# Patient Record
Sex: MALE | Race: WHITE | NOT HISPANIC OR LATINO | URBAN - METROPOLITAN AREA
[De-identification: names, ages, dates, MRNs, and addresses within clinical notes are randomized per-mention and may not be internally consistent; named-entity substitution may affect disease eponyms.]

---

## 2019-12-30 ENCOUNTER — EMERGENCY (EMERGENCY)
Facility: HOSPITAL | Age: 49
LOS: 1 days | Discharge: HOME | End: 2019-12-30
Attending: EMERGENCY MEDICINE | Admitting: EMERGENCY MEDICINE
Payer: COMMERCIAL

## 2019-12-30 VITALS
RESPIRATION RATE: 16 BRPM | TEMPERATURE: 98 F | WEIGHT: 154.98 LBS | SYSTOLIC BLOOD PRESSURE: 146 MMHG | HEART RATE: 78 BPM | OXYGEN SATURATION: 97 % | HEIGHT: 74 IN | DIASTOLIC BLOOD PRESSURE: 80 MMHG

## 2019-12-30 LAB
ALBUMIN SERPL ELPH-MCNC: 3.8 G/DL — SIGNIFICANT CHANGE UP (ref 3.4–5)
ALP SERPL-CCNC: 67 U/L — SIGNIFICANT CHANGE UP (ref 40–120)
ALT FLD-CCNC: 23 U/L — SIGNIFICANT CHANGE UP (ref 12–42)
ANION GAP SERPL CALC-SCNC: 10 MMOL/L — SIGNIFICANT CHANGE UP (ref 9–16)
AST SERPL-CCNC: 20 U/L — SIGNIFICANT CHANGE UP (ref 15–37)
BILIRUB SERPL-MCNC: 0.3 MG/DL — SIGNIFICANT CHANGE UP (ref 0.2–1.2)
BUN SERPL-MCNC: 8 MG/DL — SIGNIFICANT CHANGE UP (ref 7–23)
CALCIUM SERPL-MCNC: 8.7 MG/DL — SIGNIFICANT CHANGE UP (ref 8.5–10.5)
CHLORIDE SERPL-SCNC: 98 MMOL/L — SIGNIFICANT CHANGE UP (ref 96–108)
CO2 SERPL-SCNC: 26 MMOL/L — SIGNIFICANT CHANGE UP (ref 22–31)
CREAT SERPL-MCNC: 0.86 MG/DL — SIGNIFICANT CHANGE UP (ref 0.5–1.3)
GLUCOSE SERPL-MCNC: 107 MG/DL — HIGH (ref 70–99)
HCT VFR BLD CALC: 38.8 % — LOW (ref 39–50)
HGB BLD-MCNC: 12.9 G/DL — LOW (ref 13–17)
MCHC RBC-ENTMCNC: 30.9 PG — SIGNIFICANT CHANGE UP (ref 27–34)
MCHC RBC-ENTMCNC: 33.2 GM/DL — SIGNIFICANT CHANGE UP (ref 32–36)
MCV RBC AUTO: 93 FL — SIGNIFICANT CHANGE UP (ref 80–100)
NRBC # BLD: 0 /100 WBCS — SIGNIFICANT CHANGE UP (ref 0–0)
PLATELET # BLD AUTO: 303 K/UL — SIGNIFICANT CHANGE UP (ref 150–400)
POTASSIUM SERPL-MCNC: 4 MMOL/L — SIGNIFICANT CHANGE UP (ref 3.5–5.3)
POTASSIUM SERPL-SCNC: 4 MMOL/L — SIGNIFICANT CHANGE UP (ref 3.5–5.3)
PROT SERPL-MCNC: 7.1 G/DL — SIGNIFICANT CHANGE UP (ref 6.4–8.2)
RBC # BLD: 4.17 M/UL — LOW (ref 4.2–5.8)
RBC # FLD: 13.2 % — SIGNIFICANT CHANGE UP (ref 10.3–14.5)
SODIUM SERPL-SCNC: 134 MMOL/L — SIGNIFICANT CHANGE UP (ref 132–145)
TROPONIN I SERPL-MCNC: <0.017 NG/ML — LOW (ref 0.02–0.06)
WBC # BLD: 4.71 K/UL — SIGNIFICANT CHANGE UP (ref 3.8–10.5)
WBC # FLD AUTO: 4.71 K/UL — SIGNIFICANT CHANGE UP (ref 3.8–10.5)

## 2019-12-30 PROCEDURE — 93010 ELECTROCARDIOGRAM REPORT: CPT | Mod: 59

## 2019-12-30 PROCEDURE — 71046 X-RAY EXAM CHEST 2 VIEWS: CPT | Mod: 26

## 2019-12-30 PROCEDURE — 99285 EMERGENCY DEPT VISIT HI MDM: CPT | Mod: 25

## 2019-12-30 NOTE — ED PROVIDER NOTE - CARE PROVIDER_API CALL
Boy Carrera)  Cardiovascular Disease  [address] [facility], [address]rd [facility] [place] [address]  Phone: [phone]  Fax: [phone]  Follow Up Time:

## 2019-12-30 NOTE — ED PROVIDER NOTE - DIAGNOSTIC INTERPRETATION
ER Physician: Juancarlos Jin  CHEST XRAY INTERPRETATION: lungs clear, heart shadow normal, bony structures intact

## 2019-12-30 NOTE — ED PROVIDER NOTE - OBJECTIVE STATEMENT
48 yo male with no pertinent past medical history complains of cramping chest pain at night x 3 days. Pt report cold symptoms x 1 week, 48 yo male with no pertinent past medical history complains of episodic chest pain at night x 3 days, cramping and sharp in nature. Pt report cold symptoms x 1 week, and has been taking Sudafed for symptom relief. Pt admits to taking adderall as well with the sudafed to keep up at work. Pt reports improvement of cold symptoms, so he took a hot yoga class, but reports recurrence of chest pain, more intense in nature than previous episodes; pt took a dose of xanax last night with improvement of chest pain. No SOB, fever, chills, dizziness, back pain, nausea, vomiting, smoking history, illicit drug use, recent travel or hospitalizations.  Pt took fluconazole from previous mole toxicity 2 days ago with no improvement. 48 yo male with no pertinent past medical history complains of episodic chest pain at night x 3 days, cramping and sharp in nature. Pt report cold symptoms x 1 week, and has been taking Sudafed for symptom relief. Pt admits to taking Adderall as well with the Sudafed to keep up at work. Pt reports improvement of cold symptoms, so he took a hot yoga class, but reports recurrence of chest pain, more intense in nature than previous episodes; pt took a dose of xanax last night with improvement of chest pain. No SOB, fever, chills, dizziness, back pain, nausea, vomiting, smoking history, illicit drug use, recent travel or hospitalizations.  Pt took fluconazole from previous mole toxicity 2 days ago with no improvement.  Denies pertinent family history of MI at a young age.

## 2019-12-30 NOTE — ED PROVIDER NOTE - PATIENT PORTAL LINK FT
You can access the FollowMyHealth Patient Portal offered by [facility] by registering at the following website: [email or web address]. By joining [employer]’s FollowMyHealth portal, you will also be able to view your health information using other applications (apps) compatible with our system.

## 2019-12-30 NOTE — ED ADULT NURSE NOTE - NSIMPLEMENTINTERV_GEN_ALL_ED
Implemented All Universal Safety Interventions:  [place] to call system. Call bell, personal items and telephone within reach. Instruct patient to call for assistance. Room bathroom lighting operational. Non-slip footwear when patient is off stretcher. Physically safe environment: no spills, clutter or unnecessary equipment. Stretcher in lowest position, wheels locked, appropriate side rails in place.

## 2019-12-30 NOTE — ED ADULT NURSE NOTE - CHIEF COMPLAINT QUOTE
Patient c/o chest pain over the last 3 nights, pain only occurs at night , pt reports taking Sudafed and Adderall for the 3 days. Pain is relieved by xanax. Denies active chest pain at this time.

## 2020-01-06 DIAGNOSIS — R07.89 OTHER CHEST PAIN: ICD-10-CM

## 2020-01-06 DIAGNOSIS — Z88.2 ALLERGY STATUS TO SULFONAMIDES: ICD-10-CM

## 2020-05-04 PROBLEM — Z00.00 ENCOUNTER FOR PREVENTIVE HEALTH EXAMINATION: Status: ACTIVE | Noted: 2020-05-04

## 2020-05-04 PROBLEM — Z78.9 OTHER SPECIFIED HEALTH STATUS: Chronic | Status: ACTIVE | Noted: 2019-12-30

## 2020-05-05 ENCOUNTER — APPOINTMENT (OUTPATIENT)
Dept: HEART AND VASCULAR | Facility: CLINIC | Age: 50
End: 2020-05-05
Payer: COMMERCIAL

## 2020-05-05 DIAGNOSIS — Z87.19 PERSONAL HISTORY OF OTHER DISEASES OF THE DIGESTIVE SYSTEM: ICD-10-CM

## 2020-05-05 DIAGNOSIS — Z86.19 PERSONAL HISTORY OF OTHER INFECTIOUS AND PARASITIC DISEASES: ICD-10-CM

## 2020-05-05 PROCEDURE — 99204 OFFICE O/P NEW MOD 45 MIN: CPT | Mod: 95

## 2020-05-05 RX ORDER — VALACYCLOVIR 500 MG/1
500 TABLET, FILM COATED ORAL
Qty: 60 | Refills: 0 | Status: ACTIVE | COMMUNITY
Start: 2019-12-05

## 2020-05-05 RX ORDER — ALBUTEROL SULFATE 90 UG/1
108 (90 BASE) AEROSOL, METERED RESPIRATORY (INHALATION)
Qty: 18 | Refills: 0 | Status: ACTIVE | COMMUNITY
Start: 2020-04-14

## 2020-05-05 RX ORDER — DEXTROAMPHETAMINE SACCHARATE, AMPHETAMINE ASPARTATE MONOHYDRATE, DEXTROAMPHETAMINE SULFATE AND AMPHETAMINE SULFATE 2.5; 2.5; 2.5; 2.5 MG/1; MG/1; MG/1; MG/1
10 CAPSULE, EXTENDED RELEASE ORAL
Qty: 28 | Refills: 0 | Status: ACTIVE | COMMUNITY
Start: 2020-03-06

## 2020-05-05 RX ORDER — ALPRAZOLAM 0.5 MG/1
0.5 TABLET ORAL
Qty: 30 | Refills: 0 | Status: ACTIVE | COMMUNITY
Start: 2020-03-06

## 2020-05-05 NOTE — DISCUSSION/SUMMARY
[FreeTextEntry1] : palps/chest pain will check holter, primary care evaluation given recent covid suspect \par DEAN ANGULO and I discussed his lipid panel and individualized target LDL goal. At this point, will do diet and exercise with anticipation of re-evaluating labs in 3-6 months\par

## 2020-05-05 NOTE — REASON FOR VISIT
[Initial Evaluation] : an initial evaluation of [Chest Pain] : chest pain [FreeTextEntry1] : This visit was provided via telehealth using real-time 2 way audio visual technology. The patient, KEV BIGGS, was located at home, [address]\[place] [address] , at the time of the vist. The Doctor, Boy Carrera MD, [facility], was located at his medical office located at [address] [place] [address], [address] Floor, [place] [address] at the time of the visit. The patient, KEV BIGGS and the Doctor participated in telehealth encounter. Verbal consent was given on May 05, 2020  by the patient, self.\par \par  48 yo male with no pertinent past medical history\par complains of episodic chest pain at night x 3 days, cramping and sharp in\par nature. Pt report cold symptoms x 1 week, and has been taking Sudafed for\par symptom relief. Pt admits to taking Adderall as well with the Sudafed to keep\par up at work. Pt reports improvement of cold symptoms, so he took a hot yoga\par class, but reports recurrence of chest pain, more intense in nature than\par previous episodes; pt took a dose of xanax last night with improvement of chest\par pain. No SOB, fever, chills, dizziness, back pain, nausea, vomiting, smoking\par history, illicit drug use, recent travel or hospitalizations.\par Pt took fluconazole from previous mole toxicity 2 days ago with no improvement.\par  Denies pertinent family history of MI at a young age.

## 2021-07-13 ENCOUNTER — NON-APPOINTMENT (OUTPATIENT)
Age: 51
End: 2021-07-13

## 2021-07-13 ENCOUNTER — APPOINTMENT (OUTPATIENT)
Dept: HEART AND VASCULAR | Facility: CLINIC | Age: 51
End: 2021-07-13
Payer: COMMERCIAL

## 2021-07-13 VITALS
DIASTOLIC BLOOD PRESSURE: 82 MMHG | HEIGHT: 74 IN | WEIGHT: 156 LBS | HEART RATE: 78 BPM | BODY MASS INDEX: 20.02 KG/M2 | OXYGEN SATURATION: 99 % | SYSTOLIC BLOOD PRESSURE: 125 MMHG

## 2021-07-13 PROCEDURE — 99214 OFFICE O/P EST MOD 30 MIN: CPT

## 2021-07-13 PROCEDURE — 99072 ADDL SUPL MATRL&STAF TM PHE: CPT

## 2021-07-13 PROCEDURE — 93000 ELECTROCARDIOGRAM COMPLETE: CPT

## 2021-07-13 RX ORDER — DUTASTERIDE 0.5 MG/1
0.5 CAPSULE, LIQUID FILLED ORAL DAILY
Refills: 0 | Status: ACTIVE | COMMUNITY
Start: 2021-07-13

## 2021-07-13 NOTE — REASON FOR VISIT
[Initial Evaluation] : an initial evaluation of [Chest Pain] : chest pain [FreeTextEntry1] : 51 yo man who is being seen for angina and palpitations. Holter from last year revealed no arrhythmias but PAC/PVCs. Denies SOB/HASSAN, DALLAS and dizziness. He still complaints of intermittent palpitations for a few seconds and resolves on its own. He also reports  diffuse "cramp-like" chest pain and at one time he had worse cramping chest pain when he bent down. Since he was last seen, the symptoms resolved and has now returned again. He his here today for follow up.

## 2021-07-13 NOTE — DISCUSSION/SUMMARY
[FreeTextEntry1] : EKG section of the chart --- secondary to symptoms above an electrocardiogram also known as an EKG was performed.  Risks and benefits discussed with the patient. Patient was given time and privacy to changed into a gown. Shortly after, standard 10 leads were applied and a [employer] system was used to perform the study. The results were subsequently reviewed by attending physician and discussed with the patient. The study showed a normal sinus rhythm and no ST-T suggestive of ischemia. Order for the EKG was placed in the chart. The results were documented. Billing submitted. Emotional support provided.\par HLD KEV and I discussed his lipid panel and individualized target LDL goal. At this point, will do diet and exercise with anticipation of re-evaluating labs in 3-6 months\par CP/palps KEV and I had a discussion of his symptoms. His risk for CAD falls intro intermediate. We will therefore move forward with a stress ekg and echocardiogram at this time to evaluate for obstructive CAD, provided an insurance approval can be obtained. Risks and benefits discussed.\par

## 2021-07-13 NOTE — PHYSICAL EXAM
[Well Developed] : well developed [Well Nourished] : well nourished [No Acute Distress] : no acute distress [Normal Conjunctiva] : normal conjunctiva [Normal Venous Pressure] : normal venous pressure [No Carotid Bruit] : no carotid bruit [Normal S1, S2] : normal S1, S2 [No Murmur] : no murmur [No Rub] : no rub [No Gallop] : no gallop [Clear Lung Fields] : clear lung fields [Good Air Entry] : good air entry [No Respiratory Distress] : no respiratory distress  [Soft] : abdomen soft [Non Tender] : non-tender [No Masses/organomegaly] : no masses/organomegaly [Normal Bowel Sounds] : normal bowel sounds [Normal Gait] : normal gait [No Edema] : no edema [No Cyanosis] : no cyanosis [No Clubbing] : no clubbing [No Varicosities] : no varicosities [No Rash] : no rash [No Skin Lesions] : no skin lesions [Moves all extremities] : moves all extremities [No Focal Deficits] : no focal deficits [Normal Speech] : normal speech [Alert and Oriented] : alert and oriented [Normal memory] : normal memory [General Appearance - Well Developed] : well developed [Normal Appearance] : normal appearance [Well Groomed] : well groomed [General Appearance - Well Nourished] : well nourished [No Deformities] : no deformities [General Appearance - In No Acute Distress] : no acute distress

## 2021-08-11 ENCOUNTER — APPOINTMENT (OUTPATIENT)
Dept: HEART AND VASCULAR | Facility: CLINIC | Age: 51
End: 2021-08-11
Payer: COMMERCIAL

## 2021-08-11 VITALS
HEIGHT: 74 IN | BODY MASS INDEX: 20.53 KG/M2 | TEMPERATURE: 98.1 F | HEART RATE: 77 BPM | OXYGEN SATURATION: 98 % | WEIGHT: 160 LBS | DIASTOLIC BLOOD PRESSURE: 78 MMHG | SYSTOLIC BLOOD PRESSURE: 124 MMHG

## 2021-08-11 DIAGNOSIS — E78.2 MIXED HYPERLIPIDEMIA: ICD-10-CM

## 2021-08-11 DIAGNOSIS — R07.89 OTHER CHEST PAIN: ICD-10-CM

## 2021-08-11 DIAGNOSIS — R00.2 PALPITATIONS: ICD-10-CM

## 2021-08-11 PROCEDURE — 93306 TTE W/DOPPLER COMPLETE: CPT

## 2021-08-11 PROCEDURE — 93015 CV STRESS TEST SUPVJ I&R: CPT

## 2021-08-11 PROCEDURE — 99214 OFFICE O/P EST MOD 30 MIN: CPT | Mod: 25

## 2021-08-11 NOTE — REASON FOR VISIT
[Symptom and Test Evaluation] : symptom and test evaluation [FreeTextEntry1] : 51 yo man who is being seen for angina and palpitations. Holter from last year revealed no arrhythmias but PAC/PVCs. Denies SOB/HASSAN, DALLAS and dizziness. He still complaints of intermittent palpitations for a few seconds and resolves on its own. He also reports  diffuse "cramp-like" chest pain and at one time he had worse cramping chest pain when he bent down. Since he was last seen, the symptoms resolved and has now returned again. He his here today for follow up after his stress test.

## 2021-08-11 NOTE — DISCUSSION/SUMMARY
[FreeTextEntry1] : CP/palps stress test reviewed. Inclined towards a conservative follow up in this patient. We had a careful discussion regarding diet and exercise. Will be happy to re-evaluate.\par DEAN ANGULO and I discussed his lipid panel and individualized target LDL goal. At this point, will do diet and exercise with anticipation of re-evaluating labs in 3-6 months\par

## 2022-02-09 ENCOUNTER — APPOINTMENT (OUTPATIENT)
Dept: HEART AND VASCULAR | Facility: CLINIC | Age: 52
End: 2022-02-09

## 2023-01-01 NOTE — ED ADULT TRIAGE NOTE - CHIEF COMPLAINT QUOTE
Patient c/o chest pain over the last 3 nights, pain only occurs at night , pt reports taking Sudafed and Adderall for the 3 days. Pain is relieved by xanax. Denies active chest pain at this time. Detailed exam